# Patient Record
Sex: FEMALE | Race: WHITE | NOT HISPANIC OR LATINO | URBAN - NONMETROPOLITAN AREA
[De-identification: names, ages, dates, MRNs, and addresses within clinical notes are randomized per-mention and may not be internally consistent; named-entity substitution may affect disease eponyms.]

---

## 2023-10-24 ENCOUNTER — CLAIMS CREATED FROM THE CLAIM WINDOW (OUTPATIENT)
Dept: URBAN - NONMETROPOLITAN AREA CLINIC 2 | Facility: CLINIC | Age: 43
End: 2023-10-24
Payer: COMMERCIAL

## 2023-10-24 ENCOUNTER — LAB OUTSIDE AN ENCOUNTER (OUTPATIENT)
Dept: URBAN - NONMETROPOLITAN AREA CLINIC 2 | Facility: CLINIC | Age: 43
End: 2023-10-24

## 2023-10-24 VITALS
TEMPERATURE: 98.7 F | HEIGHT: 64 IN | DIASTOLIC BLOOD PRESSURE: 71 MMHG | HEART RATE: 73 BPM | BODY MASS INDEX: 33.73 KG/M2 | SYSTOLIC BLOOD PRESSURE: 117 MMHG | WEIGHT: 197.6 LBS

## 2023-10-24 DIAGNOSIS — K59.1 FUNCTIONAL DIARRHEA: ICD-10-CM

## 2023-10-24 DIAGNOSIS — Z80.0 FAMILY HISTORY OF COLON CANCER: ICD-10-CM

## 2023-10-24 DIAGNOSIS — R19.7 ACUTE DIARRHEA: ICD-10-CM

## 2023-10-24 DIAGNOSIS — Z12.11 COLON CANCER SCREENING: ICD-10-CM

## 2023-10-24 PROBLEM — 312824007: Status: ACTIVE | Noted: 2023-10-24

## 2023-10-24 PROBLEM — 305058001: Status: ACTIVE | Noted: 2023-10-24

## 2023-10-24 PROBLEM — 47812002: Status: ACTIVE | Noted: 2023-10-24

## 2023-10-24 PROCEDURE — 99244 OFF/OP CNSLTJ NEW/EST MOD 40: CPT | Performed by: NURSE PRACTITIONER

## 2023-10-24 PROCEDURE — 99204 OFFICE O/P NEW MOD 45 MIN: CPT | Performed by: NURSE PRACTITIONER

## 2023-10-24 RX ORDER — SODIUM PICOSULFATE, MAGNESIUM OXIDE, AND ANHYDROUS CITRIC ACID 12; 3.5; 1 G/175ML; G/175ML; MG/175ML
175 ML THE FIRST DOSE THE EVENING BEFORE AND SECOND DOSE THE MORNING OF COLONOSCOPY LIQUID ORAL ONCE A DAY
Qty: 350 MILLILITERS | Refills: 0 | OUTPATIENT
Start: 2023-10-24 | End: 2023-10-26

## 2023-10-24 NOTE — HPI-TODAY'S VISIT:
10/24/2023 April presents for colorectal cancer screening.  She has a strong family history of colon cancer in her paternal grandfather and paternal aunt.  Her genetic testing was negative.  Her father and her mother both had polyps.  She is due for colonoscopy.  She just had a baby 4 months ago via .  Her bowels are somewhat loose since 2019.  She has multiple loose bowel movements in the morning.  She takes NSAIDs on almost a daily basis due to joint pain, typically for ibuprofen.  Today we have discussed long-term risks of NSAID use.  I would do want her to follow-up with her primary care physician.  We will check labs schedule colonoscopy with biopsies to rule out microscopic colitis.  I want her to start fiber and Florastor daily.  We will follow-up pending her work-up.  MB

## 2023-10-26 ENCOUNTER — TELEPHONE ENCOUNTER (OUTPATIENT)
Dept: URBAN - NONMETROPOLITAN AREA CLINIC 2 | Facility: CLINIC | Age: 43
End: 2023-10-26

## 2023-10-26 LAB
A/G RATIO: 1.7
ALBUMIN: 4.5
ALKALINE PHOSPHATASE: 85
ALT (SGPT): 8
AST (SGOT): 14
BASO (ABSOLUTE): 0
BASOS: 0
BILIRUBIN, TOTAL: <0.2
BUN/CREATININE RATIO: 21
BUN: 16
C-REACTIVE PROTEIN, QUANT: 27
CALCIUM: 9.7
CARBON DIOXIDE, TOTAL: 23
CHLORIDE: 102
CREATININE: 0.75
DEAMIDATED GLIADIN ABS, IGA: 6
DEAMIDATED GLIADIN ABS, IGG: 3
EGFR: 103
ENDOMYSIAL ANTIBODY IGA: NEGATIVE
EOS (ABSOLUTE): 0.3
EOS: 3
GLOBULIN, TOTAL: 2.6
GLUCOSE: 89
HEMATOCRIT: 39
HEMATOLOGY COMMENTS:: (no result)
HEMOGLOBIN: 13.1
IMMATURE CELLS: (no result)
IMMATURE GRANS (ABS): 0
IMMATURE GRANULOCYTES: 0
IMMUNOGLOBULIN A, QN, SERUM: 204
LYMPHS (ABSOLUTE): 2.6
LYMPHS: 25
MCH: 29.5
MCHC: 33.6
MCV: 88
MONOCYTES(ABSOLUTE): 0.7
MONOCYTES: 7
NEUTROPHILS (ABSOLUTE): 6.8
NEUTROPHILS: 65
NRBC: (no result)
PLATELETS: 405
POTASSIUM: 4.3
PROTEIN, TOTAL: 7.1
RBC: 4.44
RDW: 12.9
SEDIMENTATION RATE-WESTERGREN: 65
SODIUM: 139
T-TRANSGLUTAMINASE (TTG) IGA: <2
T-TRANSGLUTAMINASE (TTG) IGG: <2
TSH: 0.96
WBC: 10.3

## 2023-11-01 ENCOUNTER — TELEPHONE ENCOUNTER (OUTPATIENT)
Dept: URBAN - NONMETROPOLITAN AREA CLINIC 2 | Facility: CLINIC | Age: 43
End: 2023-11-01

## 2023-11-12 LAB
C DIFFICILE TOXINS A+B, EIA: NEGATIVE
C DIFFICILE, CYTOTOXIN B: (no result)
CALPROTECTIN, FECAL: <5
CAMPYLOBACTER CULTURE: (no result)
E COLI SHIGA TOXIN EIA: NEGATIVE
GIARDIA LAMBLIA AG, EIA: NEGATIVE
Lab: (no result)
OVA + PARASITE EXAM: (no result)
SALMONELLA/SHIGELLA SCREEN: (no result)
WHITE BLOOD CELLS (WBC), STOOL: (no result)

## 2023-11-13 ENCOUNTER — TELEPHONE ENCOUNTER (OUTPATIENT)
Dept: URBAN - NONMETROPOLITAN AREA CLINIC 2 | Facility: CLINIC | Age: 43
End: 2023-11-13

## 2024-02-02 ENCOUNTER — COLON (OUTPATIENT)
Dept: URBAN - NONMETROPOLITAN AREA SURGERY CENTER 1 | Facility: SURGERY CENTER | Age: 44
End: 2024-02-02
Payer: COMMERCIAL

## 2024-02-02 DIAGNOSIS — Z83.719 FAMILY HISTORY OF COLON POLYPS, UNSPECIFIED: ICD-10-CM

## 2024-02-02 DIAGNOSIS — Z12.11 COLON CANCER SCREENING: ICD-10-CM

## 2024-02-02 PROCEDURE — 45378 DIAGNOSTIC COLONOSCOPY: CPT | Performed by: INTERNAL MEDICINE

## 2024-03-01 ENCOUNTER — OV EP (OUTPATIENT)
Dept: URBAN - NONMETROPOLITAN AREA CLINIC 2 | Facility: CLINIC | Age: 44
End: 2024-03-01

## 2024-04-15 ENCOUNTER — OV EP (OUTPATIENT)
Dept: URBAN - NONMETROPOLITAN AREA CLINIC 2 | Facility: CLINIC | Age: 44
End: 2024-04-15
Payer: COMMERCIAL

## 2024-04-15 VITALS
SYSTOLIC BLOOD PRESSURE: 113 MMHG | TEMPERATURE: 98.4 F | WEIGHT: 187 LBS | HEART RATE: 97 BPM | DIASTOLIC BLOOD PRESSURE: 77 MMHG | HEIGHT: 64 IN | BODY MASS INDEX: 31.92 KG/M2

## 2024-04-15 DIAGNOSIS — K59.1 FUNCTIONAL DIARRHEA: ICD-10-CM

## 2024-04-15 DIAGNOSIS — R79.82 ELEVATED C-REACTIVE PROTEIN (CRP): ICD-10-CM

## 2024-04-15 DIAGNOSIS — Z80.0 FAMILY HISTORY OF COLON CANCER: ICD-10-CM

## 2024-04-15 DIAGNOSIS — Z12.11 COLON CANCER SCREENING: ICD-10-CM

## 2024-04-15 PROBLEM — 119971000119104: Status: ACTIVE | Noted: 2024-04-15

## 2024-04-15 PROCEDURE — 99214 OFFICE O/P EST MOD 30 MIN: CPT | Performed by: NURSE PRACTITIONER

## 2024-04-15 NOTE — HPI-TODAY'S VISIT:
10/24/2023 April presents for colorectal cancer screening.  She has a strong family history of colon cancer in her paternal grandfather and paternal aunt.  Her genetic testing was negative.  Her father and her mother both had polyps.  She is due for colonoscopy.  She just had a baby 4 months ago via .  Her bowels are somewhat loose since 2019.  She has multiple loose bowel movements in the morning.  She takes NSAIDs on almost a daily basis due to joint pain, typically for ibuprofen.  Today we have discussed long-term risks of NSAID use.  I would do want her to follow-up with her primary care physician.  We will check labs schedule colonoscopy with biopsies to rule out microscopic colitis.  I want her to start fiber and Florastor daily.  We will follow-up pending her work-up.  MB 4/15/2024 April presents for colonoscopy follow-up.  Her colonoscopy is normal.  She discontinued NSAIDs and her bowels completely normalized.  Her CRP and sed rate were elevated however she was 3 months post  at her last visit.  Today she denies any new GI complaints.  We will recheck her inflammatory markers and follow-up in 5 years.  MB

## 2024-04-16 LAB
C-REACTIVE PROTEIN, QUANT: 60
SEDIMENTATION RATE-WESTERGREN: 52